# Patient Record
(demographics unavailable — no encounter records)

---

## 2017-12-26 NOTE — RADIOLOGY REPORT (SQ)
EXAM DESCRIPTION:  SHOULDER LEFT 2 OR MORE VIEWS



COMPLETED DATE/TIME:  12/26/2017 10:17 pm



REASON FOR STUDY:  pain



COMPARISON:  None.



NUMBER OF VIEWS:  Three views.



TECHNIQUE:  Internal rotation, external rotation, and Y view images acquired of the left shoulder.



LIMITATIONS:  None.



FINDINGS:  MINERALIZATION: Normal.

BONES: No acute fracture or dislocation.  No worrisome bone lesions.

JOINTS: No dislocation.

VISUALIZED LUNGS AND RIBS: No pneumothorax.  No rib fracture.

SOFT TISSUES: No radiopaque foreign body.

OTHER: No other significant finding.



IMPRESSION:  NO RADIOGRAPHIC EVIDENCE OF ACUTE INJURY.



TECHNICAL DOCUMENTATION:  JOB ID:  [id number]

[id number]

 2011 [employer]- All Rights Reserved

## 2018-04-25 NOTE — RADIOLOGY REPORT (SQ)
EXAM DESCRIPTION:  ACUTE ABDOMEN SERIES



COMPLETED DATE/TIME:  4/25/2018 1:31 pm



REASON FOR STUDY:  Abdominal pain



COMPARISON:  None.



NUMBER OF VIEWS:  Three views.



TECHNIQUE:  Frontal chest, supine abdomen and upright/decubitus abdomen radiographic images acquired.




LIMITATIONS:  None.



FINDINGS:  CHEST: Lungs clear of infiltrates.

FREE AIR: None. No abnormal gas collections.

BOWEL GAS PATTERN: Nonobstructive pattern. No dilated loops or air fluid levels.  Intestinal bowel ga
s is identified in nondistended large and small bowel loops.

CALCIFICATIONS: No suspicious calcifications.

HARDWARE: None in the abdomen.

SOFT TISSUES: No gross mass or suggestion of organomegaly.

BONES: No acute fracture.  No worrisome bone lesions.

OTHER: No other significant finding.



IMPRESSION:  NO RADIOGRAPHIC EVIDENCE FOR ACUTE ABDOMINAL DISEASE.



TECHNICAL DOCUMENTATION:  JOB ID:  [id number]

 2011 [employer]- All Rights Reserved



Reading location - IP/workstation name: [email or web address]

## 2018-04-25 NOTE — ER DOCUMENT REPORT
ED GI/





- General


Chief Complaint: Groin Pain


Stated Complaint: RIGHT GROIN PAIN


Time Seen by Provider: 04/25/18 11:43


Information source: Patient


Notes: 


History of present illness-having pain over the right groin for the last few 

days after lifting heavy object.  Pain comes and goes particularly when he 

stands up pain increases laying down decreases the pain.  No dysuria frequency 

no nausea vomiting.


REVIEW OF SYSTEMS:


CONSTITUTIONAL :  Denies fever,  chills, or sweats.  Denies recent illness.


EENT:   Denies eye, ear, throat, or mouth pain or symptoms.  Denies nasal or 

sinus congestion or discharge.  Denies throat, tongue, or mouth swelling or 

difficulty swallowing.


CARDIOVASCULAR:  Denies chest pain.  Denies palpitations or racing or irregular 

heart beat.  Denies ankle edema.


RESPIRATORY:  Denies cough, cold, or chest congestion.  Denies shortness of 

breath, difficulty breathing, or wheezing.


GASTROINTESTINAL:  Denies abdominal pain or distention.  Denies nausea, vomiting

, or diarrhea.  Denies blood in vomitus, stools, or per rectum.  Denies black, 

tarry stools.  Denies constipation.  


GENITOURINARY:  Denies difficulty urinating, painful urination, burning, 

frequency, blood in urine, or discharge.


MUSCULOSKELETAL:  Denies back or neck pain or stiffness.  Denies joint pain or 

swelling.


SKIN:   Denies rash, lesions or sores.


HEMATOLOGIC :   Denies easy bruising or bleeding.


LYMPHATIC:  Denies swollen, enlarged glands.


NEUROLOGICAL:  Denies confusion or altered mental status.  Denies passing out 

or loss of consciousness.  Denies dizziness or lightheadedness.  Denies 

headache.  Denies weakness or paralysis or loss of use of either side.  Denies 

problems with gait or speech.  Denies sensory loss, numbness, or tingling.  

Denies seizures.


PSYCHIATRIC:  Denies anxiety or stress.  Denies depression, suicidal ideation, 

or homicidal ideation.





ALL OTHER SYSTEMS REVIEWED AND NEGATIVE.





Dictation was performed using Dragon voice recognition software 





PHYSICAL EXAMINATION:





GENERAL: Well-appearing, well-nourished and in no acute distress.





HEAD: Atraumatic, normocephalic.





EYES: Pupils equal round and reactive to light, extraocular movements intact, 

sclera anicteric, conjunctiva are normal.





ENT: Nares patent, oropharynx clear without exudates.  Moist mucous membranes.





NECK: Normal range of motion, supple without lymphadenopathy





LUNGS: Breath sounds clear to auscultation bilaterally and equal.  No wheezes 

rales or rhonchi.





HEART: Regular rate and rhythm without murmurs





ABDOMEN: Soft, right inguinal region there is slight tenderness were noted, 

particularly on coughing that is a bulging in paternal inguinal hernia noted.  

It is not descending to the testicles..  No guarding, no rebound.  No masses 

appreciated.





Musculoskeletal: Normal range of motion, no pitting or edema.  No cyanosis.





NEUROLOGICAL: Cranial nerves grossly intact.  Normal speech, normal gait.  

Normal sensory, motor exams 





PSYCH: Normal mood, normal affect.





SKIN: Warm, Dry, normal turgor, no rashes or lesions noted.


TRAVEL OUTSIDE OF THE [place] IN LAST 30 DAYS: No





- HPI


Patient complains to provider of: Abdominal pain


Onset: Last week


Timing/Duration: denies: Sudden, Gradual, Constant, Intermittent, Persistent, 

Waxing and waning, Better, Worse, Gone


Quality of pain: denies: No pain, Achy, Burning, Cramping, Dull, Fullness, 

Pressure, Sharp, Stabbing, Throbbing, Other


Severity at maximum: Moderate


Pain Level: 3


Context: denies: Bad food, Lifting, Out of the country travel, Pregnant, Recent 

trauma, Other


Location: RUQ.  No: Chest pain, Epigastric, LUQ, LLQ, RLQ, Left flank, Right 

flank, Low back, Suprapubic, Pelvis, Left testicle, Right testicle, Rectal, 

Other


Sexual history: denies: Active, Inactive, New partner, Multiple partners, 

Unprotected intercourse, Rectal penetration, STD exposure, Condoms


Associated symptoms: denies: None, Blood in emesis, Blood in stool, Chest pain, 

Chills, Coffee ground emesis, Constipation, Diarrhea, Dizzy, Dysuria, Erection 

problem, Fever, Foreskin problem, Hard stool, Hematuria, Hematospermia, Hurts 

to breath, Inguinal mass, Lightheaded, Loss of appetite, Nausea, Painful 

intercourse, Penile discharge, Radiates to back, Radiates to chest, Radiates to 

testicles, Radiates to shoulder, Shortness of breath, Sweaty, Syncope, Urinary 

hesitancy, Urinary frequency, Urinary retention, Urinary urgency, Vomiting, 

Other


Exacerbated by: denies: Denies, Supine, Sitting, Standing, Movement, Walking, 

Coughing, Deep breathing, Food, Other





- Related Data


Allergies/Adverse Reactions: 


 





No Known Allergies Allergy (Verified 04/25/18 11:31)


 











Past Medical History





- Social History


Smoking Status: Current Every Day Smoker


Chew tobacco use (# tins/day): No


Frequency of alcohol use: Rare


Drug Abuse: None


Family History: Reviewed & Not Pertinent


Patient has suicidal ideation: No


Patient has homicidal ideation: No


Pulmonary Medical History: 


   Denies: None, Hx Asthma, Hx Bronchitis, Hx COPD, Hx Pneumonia, Hx Intubation

, Hx Respiratory Failure, Hx Sleep Apnea, Hx Tuberculosis, Other


EENT Medical History: 


   Denies: None, Eyes, Ears, Nose, Throat, Other


Neurological Medical History: Denies: None, Hx Cerebrovascular Accident, Hx 

Migraine, Hx Seizures, Other


Endocrine Medical History: Denies: None, Hx Diabetes Mellitus Type 1, Hx 

Diabetes Mellitus Type 2, Hx Graves' Disease, Hx Hyperthyroidism, Hx 

Hypothyroidism, Other


Renal/ Medical History: Denies: None, Hx Benign Prostatic Hyperplasia, Hx End 

Stage Renal Disease, Hx Epididymitis, Hx Hemodialysis, Hx Hydrocele, Hx Kidney 

Stones, Hx Peritoneal Dialysis, Hx Renal Insufficiency, Hx Testicular Torsion, 

Hx Varicocele, Other





Review of Systems





- Review of Systems


Constitutional: denies: No symptoms reported, See HPI, Chills, Diaphoresis, 

Fever, Malaise, Weakness, Other, Weight gain, Weight loss, Recent illness


EENT: denies: No symptoms reported, See HPI, Eye pain, Eye discharge, Blurred 

vision, Tearing, Double vision, Ear pain, Ear discharge, Nose pain, Nose 

congestion, Nose discharge, Sinus pressure, Sinus discharge, Throat pain, 

Difficulty swallowing, Throat swelling, Mouth pain, Mouth swelling, Dental 

problem, Vertigo, Other


Cardiovascular: denies: No symptoms reported, See HPI, Chest pain, Palpitations

, Heart racing, Orthopnea, Dyspnea, Syncope, Dizziness, Lightheaded, Edema, 

Other, Paroxysmal Nocturnal Dysp


Respiratory: denies: No symptoms reported, See HPI, Cough, Hurts to breathe, 

Hemoptysis, Short of breath, Sputum, Stridor, Wheezing, Other


Gastrointestinal: Abdominal pain.  denies: No symptoms reported, See HPI, 

Abdomen distended, Diarrhea, Nausea, Vomiting, Constipation, Blood streaked 

bowels, Poor appetite, Poor fluid intake, Blood in vomit, Black stools, Rectal 

bleeding, Last bowel movement, Fecal incontinence, Other


Genitourinary: denies: No symptoms reported, See HPI, Burning, Dysuria, 

Discharge, Frequency, Flank pain, Hematuria, Incontinence, Pain, Urgency, 

Retention, Other


Neurological/Psychological: denies: No symptoms reported, See HPI, Confusion, 

Dementia, Depression, Hallucinations, Anxiety, Homicidal ideation, Sensory 

change, Weakness, Gait changes, Loss of power, Paralysis, Seizure, Lost 

consciousness, Headaches, Speech impairment, Numbness, Suicidal ideation, 

Tingling, Tremor, Other





Physical Exam





- Vital signs


Vitals: 





 











Temp Pulse Resp BP Pulse Ox


 


 98.4 F   75   16   128/85 H  99 


 


 04/25/18 11:20  04/25/18 11:20  04/25/18 11:20  04/25/18 11:20  04/25/18 11:20














Course





- Vital Signs


Vital signs: 





 











Temp Pulse Resp BP Pulse Ox


 


 98.4 F   75   16   128/85 H  99 


 


 04/25/18 11:20  04/25/18 11:20  04/25/18 11:20  04/25/18 11:20  04/25/18 11:20














- Laboratory


Result Diagrams: 


 04/25/18 11:55





 04/25/18 11:55


Laboratory results interpreted by me: 





 











  04/25/18 04/25/18 04/25/18





  11:55 11:55 11:55


 


RBC  5.75 H  


 


BUN   6 L 


 


Urine Urobilinogen    4.0 H














- Diagnostic Test


Radiology reviewed: Reports reviewed - Abdominal series did not show any air-

fluid levels or distention of the bowel loop.  No obstructions





Discharge





- Discharge


Clinical Impression: 


 Inguinal hernia of left side without obstruction or gangrene





Condition: Fair


Disposition: HOME, SELF-CARE


Instructions:  [facility] ([facility])


Prescriptions: 


Hydrocodone/Acetaminophen [Vicodin 5-300 mg Tablet] 1 - 2 tab PO ASDIR PRN #15 

tab


 PRN Reason:

## 2019-09-15 NOTE — ER DOCUMENT REPORT
ED Medical Screen (RME)





- General


Chief Complaint: Groin Pain


Stated Complaint: RIGHT GROIN PAIN


Time Seen by Provider: 04/25/18 11:43


Notes: 





Patient states that he has been doing a lot of heavy lifting and is noticed 

some pain in his right inguinal area.  No testicle pain.  No problems with 

urination.  He states he has not felt a mass but is having pain.


TRAVEL OUTSIDE OF THE [place] IN LAST 30 DAYS: No





- Related Data


Allergies/Adverse Reactions: 


 





No Known Allergies Allergy (Verified 04/25/18 11:31)


 











Past Medical History





- Social History


Chew tobacco use (# tins/day): No


Frequency of alcohol use: Rare


Drug Abuse: None


Renal/ Medical History: Denies: Hx Peritoneal Dialysis





Physical Exam





- Vital signs


Vitals: 





 











Temp Pulse Resp BP Pulse Ox


 


 98.4 F   75   16   128/85 H  99 


 


 04/25/18 11:20  04/25/18 11:20  04/25/18 11:20  04/25/18 11:20  04/25/18 11:20














Course





- Vital Signs


Vital signs: 





 











Temp Pulse Resp BP Pulse Ox


 


 98.4 F   75   16   128/85 H  99 


 


 04/25/18 11:20  04/25/18 11:20  04/25/18 11:20  04/25/18 11:20  04/25/18 11:20 normal...